# Patient Record
(demographics unavailable — no encounter records)

---

## 2025-01-24 NOTE — HISTORY OF PRESENT ILLNESS
[Dull/Aching] : dull/aching [Localized] : localized [Tightness] : tightness [de-identified] : Had a fall on outstretched hands playing soccer about 1 month ago. Right wrist still a bit sore, slower to return to normal than left wrist. He is RHD [FreeTextEntry1] : RT wrist  [FreeTextEntry5] : RT wrist pain that developed one month ago from playing soccer.

## 2025-01-24 NOTE — PHYSICAL EXAM
[Right] : right hand [Dorsal Wrist] : dorsal wrist [] : negative TFCC grind [de-identified] : no SL tenderness, no snuffbox pain [TWNoteComboBox7] : dorsiflexion 50 degrees [TWNoteComboBox4] : volarflexion 40 degrees